# Patient Record
Sex: FEMALE | Race: WHITE | NOT HISPANIC OR LATINO | Employment: FULL TIME | URBAN - METROPOLITAN AREA
[De-identification: names, ages, dates, MRNs, and addresses within clinical notes are randomized per-mention and may not be internally consistent; named-entity substitution may affect disease eponyms.]

---

## 2017-05-02 ENCOUNTER — HISTORICAL (OUTPATIENT)
Dept: ADMINISTRATIVE | Facility: HOSPITAL | Age: 52
End: 2017-05-02

## 2022-04-10 ENCOUNTER — HISTORICAL (OUTPATIENT)
Dept: ADMINISTRATIVE | Facility: HOSPITAL | Age: 57
End: 2022-04-10
Payer: MEDICAID

## 2022-04-27 VITALS
BODY MASS INDEX: 35.83 KG/M2 | WEIGHT: 209.88 LBS | DIASTOLIC BLOOD PRESSURE: 72 MMHG | HEIGHT: 64 IN | SYSTOLIC BLOOD PRESSURE: 128 MMHG

## 2022-04-30 NOTE — PROGRESS NOTES
"   Patient:   JILL HUNTER            MRN: [id number]            FIN: [id number]               Age:   51 years     Sex:  Female     :  1965   Associated Diagnoses:   Calcific tendonitis of left shoulder; Obesity   Author:   Jean-Pierre Servin NP      Chief Complaint   2017 8:16 CDT        Outside referral for Left shoulder pain/tendinitis  (Modified)       History of Present Illness   50 yo  female presents to ortho clinic for c/o left shoulder pain.  Reports that the pain to left shoulder started after falling "a few months ago" and landing on left shoulder.  Admits that the pain is "much better" now.  Reports that upward and overhead movement made the pain worse.  Pt. is RHD.  Reports working as a [employer].  Admits taking prescribed medication as needed for pain with good relief.  Admits that she has gotten back to normal activity with no difficulty.  No other complaints today.      Review of Systems   ROS reviewed as documented in chart      Health Status   Allergies:    Allergic Reactions (Selected)  Severity Not Documented  Levaquin- No reactions were documented.,    Allergies (1) Active Reaction  Levaquin None Documented     Current medications:  (Selected)   Prescriptions  Prescribed  Azithromycin 5 Day Dose Pack 250 mg oral tablet: = 1 packet(s), Oral, Daily, as directed on package labeling, # 6 tab(s), 0 Refill(s), Pharmacy: [id number]  ibuprofen 800 mg oral tablet: 800 mg = 1 tab(s), Oral, TID, PRN PRN as needed for pain, with food or milk, # 30 tab(s), 3 Refill(s), Pharmacy: [facility] [id number]  Documented Medications  Documented  aspirin 81 mg oral tablet: 1 tab(s) ( 81 mg ), PO, Daily, # 30 tab(s), 0 Refill(s), Type: Maintenance  atorvastatin 20 mg oral tablet: 1 tab(s) ( 20 mg ), PO, Daily, # 30 tab(s), 0 Refill(s), Type: Maintenance  glipiZIDE 10 mg oral tablet: 10 mg = 1 tab(s), Oral, Daily, 0 Refill(s)  lisinopril 20 mg oral tablet: 20 mg = 1 " tab(s), Oral, Daily, 0 Refill(s)  loratadine 10 mg oral capsule: 0 Refill(s), Type: Maintenance  metFORMIN 1000 mg oral tablet, extended release: 1,000 mg = 1 tab(s), Oral, Daily, 0 Refill(s),    Home Medications (8) Active  aspirin 81 mg oral tablet   atorvastatin 20 mg oral tablet   Azithromycin 5 Day Dose Pack 250 mg oral tablet 1 packet(s), Oral, Daily  glipiZIDE 10 mg oral tablet 10 mg = 1 tab(s), Oral, Daily  ibuprofen 800 mg oral tablet 800 mg = 1 tab(s), PRN, Oral, TID  lisinopril 20 mg oral tablet 20 mg = 1 tab(s), Oral, Daily  loratadine 10 mg oral capsule   metFORMIN 1000 mg oral tablet, extended release 1,000 mg = 1 tab(s), Oral, Daily  ,    No qualifying data available     Problem list:    All Problems  Diabetes / SNOMED CT [id number] / Confirmed  Hyperlipidemia / SNOMED CT 52307931 / Confirmed  Hypertension / SNOMED CT 38893085 / Confirmed  Obesity(  Probable Diagnosis  ) / SNOMED CT 1394920524 / Confirmed,    Active Problems (4)  Diabetes   Hyperlipidemia   Hypertension   Obesity(  Probable Diagnosis  )         Histories   Past Medical History:    No active or resolved past medical history items have been selected or recorded.   Family History:    Hypertension  Father  Mother  Diabetes mellitus type 2  Mother  Heart attack  Father     Procedure history:    Tubal ligation ([id number]).   Social History        Social & Psychosocial Habits    Tobacco  06/16/2015  Use: Never smoker    05/02/2017  Use: Former smoker    Comment: 10 yrs - 05/02/2017 08:19 - Meliza Freeman LPN  .        Physical Examination   Vital Signs   5/2/2017 8:16 CDT        Temperature Oral          37 DegC                             Peripheral Pulse Rate     70 bpm                             Respiratory Rate          20 br/min                             Systolic Blood Pressure   128 mmHg                             Diastolic Blood Pressure  72 mmHg     Measurements from flowsheet : Measurements    5/2/2017 8:16 CDT        Weight Dosing             95.2 kg                             Weight Measured           95.2 kg                             Weight Measured and Calculated in Lbs     209.88 lb                             Height/Length Dosing      162.56 cm                             Height/Length Measured    162.56 cm                             BSA Measured              2.07 m2                             Body Mass Index Measured  36.03 kg/m2     General:  Alert and oriented.    HENT:  Normocephalic.    Neck:  Supple.    Integumentary:  Warm, Dry, Pink.    Neurologic:  No focal deficits.    Cognition and Speech:  Speech clear and coherent.    Psychiatric:  Appropriate mood & affect.    Left shoulder exam:  Good ROM with no pain associated with upward/lateral movement.  Minimal TTP to supraspinatus tendon.  Supraspinatus stress (+).  Drop arm (-).  Reynolds (-).  Neer (-).  NV intact.  No swelling or gross deformity noted.  +2 radial pulse.  FF = 160.  ER = 60.  IR = L-spine.  Abduction = 140.  No periscapular atrophy noted.         Health Maintenance      Health Maintenance     Pending (in the next year)        Due            Alcohol Misuse Screening due  05/02/17  and every 1  year(s)           Breast Cancer Screening due  05/02/17  Variable frequency           Cervical Cancer Screening due  05/02/17  and every 5  year(s)           Colorectal Screening due  05/02/17  Variable frequency           Depression Screening due  05/02/17  and every 1  year(s)           Diabetes Screening due  05/02/17  Variable frequency           HIV Screening due  05/02/17  and every 1  year(s)           Hypertension Management-Education due  05/02/17  and every 1  year(s)           Hypertension Management-BMP due  05/02/17  Variable frequency           Lipid Screening due  05/02/17  Variable frequency           Smoking Cessation due  05/02/17  and every 1  year(s)           Tetanus Vaccine due  05/02/17  and every 10   year(s)        Due In Future            Influenza Vaccine not due until  10/02/17  and every 1  year(s)           Hypertension Management-Blood Pressure not due until  11/02/17  and every 6  month(s)     Satisfied (in the past 1 year)        Satisfied            Blood Pressure Screening on  05/02/17.  Satisfied by Meliza Freeman LPN           Body Mass Index Check on  05/02/17.  Satisfied by Meliza Freeman LPN           Hypertension Management-Blood Pressure on  05/02/17.  Satisfied by Meliza Freeman LPN           Obesity Screening on  05/02/17.  Satisfied by Meliza Freeman LPN           Tobacco Use Screening on  05/02/17.  Satisfied by Meliza Freeman LPN          Review / Management   Results review:     No qualifying data available.      Left shoulder x-rays (5/2/17):    Impression:  No acute abnormalities.  Evidence of calcific tendinitis noted.      Impression and Plan   Diagnosis     Calcific tendonitis of left shoulder (VHO91-WI M75.32).     Obesity (SST68-UT E66.9).       Plan:  1.  Continue returning to normal activity as tolerated.  2.  ROM/stretching exercises to left UE (printed instructions and exercise bands given to pt.).  3.  Continue prescribed medication as needed for pain relief.  4.  Ice compresses and activity modifications as needed for symptom relief.  5.  Diet modifications and low impact exercises to help with weight loss.  6.  RTC PRN.      Patient Instructions:  Calcific Tendinitis, Obesity, Easy-to-Read.

## 2022-06-29 NOTE — PROGRESS NOTES
Chief Complaint: No chief complaint on file.      HPI: Patient is a 56 year-old female referred to Urology for gross hematuria.  Patient states she was previously on Xarelto when she had episode of gross hematuria.  Patient is no longer on Xarelto and has not had any more gross hematuria.  Today patient denies dysuria, urinary urgency, frequency, incontinence, retention, gross hematuria, nocturia. Patient denies family history of urologic pathology, personal history of stones.     Plan:  UA now F/U 1 month..    Allergies:  Review of patient's allergies indicates:  Not on File    Medications:  No current outpatient medications on file.     No current facility-administered medications for this visit.       Review of Systems:  General: No fever, chills, fatigability, or weight loss.  Skin: No rashes, itching, or changes in color or texture of skin.  Chest: Denies HOPSON, cyanosis, wheezing, cough, and sputum production.  Abdomen: Appetite fine. No weight loss. Denies diarrhea, abdominal pain, hematemesis, or blood in stool.  Musculoskeletal: No joint stiffness or swelling. Denies back pain.  : As above.  All other review of systems negative.    PMH:  No past medical history on file.    PSH:  No past surgical history on file.    FamHx:  No family history on file.    SocHx:       Physical Exam:  There were no vitals filed for this visit.  General: A&Ox3, no apparent distress, no deformities  Neck: No masses, normal thyroid  Lungs: CTA finn, no use of accessory muscles  Heart: RRR, no arrhythmias  Abdomen: Soft, NT, ND, no masses, no hernias, no hepatosplenomegaly  Lymphatic: Neck and groin nodes negative  Skin: The skin is warm and dry. No jaundice.  Ext: No c/c/e.    Labs: none      Imaging: none      Impression: Hematuria      Plan: UA now F/U 1 month.

## 2022-06-30 ENCOUNTER — OFFICE VISIT (OUTPATIENT)
Dept: UROLOGY | Facility: CLINIC | Age: 57
End: 2022-06-30
Payer: MEDICAID

## 2022-06-30 VITALS
HEART RATE: 57 BPM | BODY MASS INDEX: 31.92 KG/M2 | OXYGEN SATURATION: 100 % | DIASTOLIC BLOOD PRESSURE: 72 MMHG | WEIGHT: 187 LBS | SYSTOLIC BLOOD PRESSURE: 123 MMHG | HEIGHT: 64 IN | TEMPERATURE: 98 F | RESPIRATION RATE: 18 BRPM

## 2022-06-30 DIAGNOSIS — R31.9 HEMATURIA, UNSPECIFIED TYPE: Primary | ICD-10-CM

## 2022-06-30 DIAGNOSIS — R31.0 GROSS HEMATURIA: ICD-10-CM

## 2022-06-30 LAB
APPEARANCE UR: CLEAR
BACTERIA #/AREA URNS AUTO: ABNORMAL /HPF
BILIRUB UR QL STRIP.AUTO: NEGATIVE MG/DL
COLOR UR AUTO: ABNORMAL
GLUCOSE UR QL STRIP.AUTO: NORMAL MG/DL
HYALINE CASTS #/AREA URNS LPF: ABNORMAL /LPF
KETONES UR QL STRIP.AUTO: NEGATIVE MG/DL
LEUKOCYTE ESTERASE UR QL STRIP.AUTO: 500 UNIT/L
MUCOUS THREADS URNS QL MICRO: ABNORMAL /LPF
NITRITE UR QL STRIP.AUTO: NEGATIVE
NON-SQ EPI CELLS URNS QL MICRO: ABNORMAL /HPF
PH UR STRIP.AUTO: 6.5 [PH]
PROT UR QL STRIP.AUTO: NEGATIVE MG/DL
RBC #/AREA URNS AUTO: ABNORMAL /HPF
RBC UR QL AUTO: ABNORMAL UNIT/L
SP GR UR STRIP.AUTO: 1.01
SQUAMOUS #/AREA URNS LPF: ABNORMAL /HPF
UROBILINOGEN UR STRIP-ACNC: NORMAL MG/DL
WBC #/AREA URNS AUTO: ABNORMAL /HPF

## 2022-06-30 PROCEDURE — 3008F PR BODY MASS INDEX (BMI) DOCUMENTED: ICD-10-PCS | Mod: CPTII,,, | Performed by: NURSE PRACTITIONER

## 2022-06-30 PROCEDURE — 1159F PR MEDICATION LIST DOCUMENTED IN MEDICAL RECORD: ICD-10-PCS | Mod: CPTII,,, | Performed by: NURSE PRACTITIONER

## 2022-06-30 PROCEDURE — 3074F SYST BP LT 130 MM HG: CPT | Mod: CPTII,,, | Performed by: NURSE PRACTITIONER

## 2022-06-30 PROCEDURE — 3078F DIAST BP <80 MM HG: CPT | Mod: CPTII,,, | Performed by: NURSE PRACTITIONER

## 2022-06-30 PROCEDURE — 3074F PR MOST RECENT SYSTOLIC BLOOD PRESSURE < 130 MM HG: ICD-10-PCS | Mod: CPTII,,, | Performed by: NURSE PRACTITIONER

## 2022-06-30 PROCEDURE — 87088 URINE BACTERIA CULTURE: CPT | Performed by: NURSE PRACTITIONER

## 2022-06-30 PROCEDURE — 99203 OFFICE O/P NEW LOW 30 MIN: CPT | Mod: S$PBB,,, | Performed by: NURSE PRACTITIONER

## 2022-06-30 PROCEDURE — 1159F MED LIST DOCD IN RCRD: CPT | Mod: CPTII,,, | Performed by: NURSE PRACTITIONER

## 2022-06-30 PROCEDURE — 4010F PR ACE/ARB THEARPY RXD/TAKEN: ICD-10-PCS | Mod: CPTII,,, | Performed by: NURSE PRACTITIONER

## 2022-06-30 PROCEDURE — 3078F PR MOST RECENT DIASTOLIC BLOOD PRESSURE < 80 MM HG: ICD-10-PCS | Mod: CPTII,,, | Performed by: NURSE PRACTITIONER

## 2022-06-30 PROCEDURE — 3008F BODY MASS INDEX DOCD: CPT | Mod: CPTII,,, | Performed by: NURSE PRACTITIONER

## 2022-06-30 PROCEDURE — 99214 OFFICE O/P EST MOD 30 MIN: CPT | Mod: PBBFAC | Performed by: NURSE PRACTITIONER

## 2022-06-30 PROCEDURE — 99203 PR OFFICE/OUTPT VISIT, NEW, LEVL III, 30-44 MIN: ICD-10-PCS | Mod: S$PBB,,, | Performed by: NURSE PRACTITIONER

## 2022-06-30 PROCEDURE — 81001 URINALYSIS AUTO W/SCOPE: CPT | Performed by: NURSE PRACTITIONER

## 2022-06-30 PROCEDURE — 1160F RVW MEDS BY RX/DR IN RCRD: CPT | Mod: CPTII,,, | Performed by: NURSE PRACTITIONER

## 2022-06-30 PROCEDURE — 4010F ACE/ARB THERAPY RXD/TAKEN: CPT | Mod: CPTII,,, | Performed by: NURSE PRACTITIONER

## 2022-06-30 PROCEDURE — 1160F PR REVIEW ALL MEDS BY PRESCRIBER/CLIN PHARMACIST DOCUMENTED: ICD-10-PCS | Mod: CPTII,,, | Performed by: NURSE PRACTITIONER

## 2022-06-30 RX ORDER — SACUBITRIL AND VALSARTAN 49; 51 MG/1; MG/1
1 TABLET, FILM COATED ORAL 2 TIMES DAILY
COMMUNITY
Start: 2022-06-06

## 2022-06-30 RX ORDER — ASPIRIN 81 MG/1
81 TABLET ORAL DAILY
COMMUNITY

## 2022-06-30 RX ORDER — LORATADINE 10 MG/1
10 TABLET ORAL DAILY
COMMUNITY

## 2022-06-30 RX ORDER — AZELASTINE HCL 205.5 UG/1
2 SPRAY NASAL 2 TIMES DAILY PRN
COMMUNITY
Start: 2022-05-21

## 2022-06-30 RX ORDER — MECLIZINE HYDROCHLORIDE 25 MG/1
25 TABLET ORAL EVERY 8 HOURS PRN
COMMUNITY
Start: 2022-05-20

## 2022-06-30 RX ORDER — APIXABAN 5 MG/1
5 TABLET, FILM COATED ORAL 2 TIMES DAILY
COMMUNITY
Start: 2022-05-09

## 2022-06-30 RX ORDER — METFORMIN HYDROCHLORIDE 1000 MG/1
1000 TABLET ORAL DAILY
COMMUNITY
Start: 2022-03-06

## 2022-06-30 RX ORDER — FLUTICASONE PROPIONATE 50 MCG
2 SPRAY, SUSPENSION (ML) NASAL DAILY
COMMUNITY
Start: 2022-06-06

## 2022-06-30 RX ORDER — ATORVASTATIN CALCIUM 20 MG/1
20 TABLET, FILM COATED ORAL DAILY
COMMUNITY
Start: 2022-04-03

## 2022-06-30 RX ORDER — CARVEDILOL 3.12 MG/1
3.12 TABLET ORAL 2 TIMES DAILY
COMMUNITY
Start: 2022-04-04

## 2022-06-30 RX ORDER — VIT C/E/ZN/COPPR/LUTEIN/ZEAXAN 250MG-90MG
1000 CAPSULE ORAL EVERY OTHER DAY
COMMUNITY

## 2022-06-30 NOTE — PROGRESS NOTES
Pt seen by Dr. Houser; Urine collected & sent to lab; Pt instructed to return to clinic in 1 month for follow up; Discharge paperwork given w/pt verbalizing understanding

## 2022-07-03 LAB — BACTERIA UR CULT: NORMAL

## 2022-07-05 ENCOUNTER — TELEPHONE (OUTPATIENT)
Dept: UROLOGY | Facility: CLINIC | Age: 57
End: 2022-07-05
Payer: MEDICAID

## 2022-07-05 NOTE — TELEPHONE ENCOUNTER
Spoke to patient via telephone regarding UA results which are negative but only trace blood noted which patient was looking for to clear her due to being on Xarelto in the past.  Instructed patient if he develops any since notify clinic to be re-evaluated treated.

## 2023-09-22 ENCOUNTER — PATIENT OUTREACH (OUTPATIENT)
Dept: ADMINISTRATIVE | Facility: HOSPITAL | Age: 58
End: 2023-09-22

## 2023-09-22 LAB — BCS RECOMMENDATION EXT: NORMAL

## 2023-12-06 LAB
GFR ESTIMATION: >60
MICROALBUMIN/CREATININE RATIO: 28.2 UG/MG